# Patient Record
(demographics unavailable — no encounter records)

---

## 2020-02-05 NOTE — RAD
Right lower extremity venous Doppler ultrasound

 

History: Right leg pain and swelling.

 

Comparison: None.

 

Procedure: Color flow Doppler, Doppler spectral analysis, and 2D images 

are obtained with and without compression in the area of the common 

femoral vein, superficial femoral vein - femoral vein junction, main 

femoral vein (superficial femoral vein) and popliteal vein. Veins of the 

proximal calf are also imaged.

 

Findings: 

There is normal color flow, augmentation, and compressibility of all 

visualized vein segments. No evidence of deep venous thrombus is present. 

 

There is a complex collection lateral to the popliteal fossa measuring 5.1

x 3.7 x 1.6 cm. The lesion is mostly hypoechoic. Small internal 

echogenicities may be calcifications. Color Doppler interrogation is 

negative. Chronic hematoma is a consideration.

 

More inferiorly in the lateral calf there is relatively anechoic fluid 

measuring 7.7 x 3 x 2.1 cm. Finding could be a dissected popliteal fossa 

cyst or a seroma.

 

IMPRESSION:

Right lower extremity is negative for DVT.

 

Electronically signed by: Pedro Hooker MD (2/5/2020 12:17 PM) [id number]

## 2020-02-09 NOTE — PHYS DOC
Past History


Past Medical History:  Hypertension, Other


Additional Past Medical Histor:  Blood clots, Cardiac


Past Surgical History:  Pacemaker


Alcohol Use:  None





Adult General


Chief Complaint


Chief Complaint:  MECHANICAL FALL





HPI


HPI





Patient is a 80-year-old  female on [facility] presents to the ER 

secondary to a fall. Patient states that she was in the parking lot at [facility] 

and she lost her balance. She has pain in the left hand and the laceration on t

he posterior aspect. No obvious deformities or numbness or tingling. She also 

points of contusion/hematoma to the right hip but does not have pain with 

ambulation. Her tetanus is not up-to-date. She did not strike her head. Chest 

pain, shortness of breath, neck pain, back pain, other extremity pain.





Review of Systems


Review of Systems


All other ROS is negative unless otherwise stated in HPI





Allergies


Allergies





Allergies








Coded Allergies Type Severity Reaction Last Updated Verified


 


  clopidogrel Allergy Intermediate  2/9/20 Yes


 


  Sulfa (Sulfonamide Antibiotics) Allergy Unknown  2/9/20 Yes


 


  penicillin G Allergy Unknown  2/9/20 Yes











Physical Exam


Physical Exam


See above


Constitutional: Well developed, well nourished, no acute distress, non-toxic 

appearance. []


HENT: Normocephalic, atraumatic, bilateral external ears normal, oropharynx mois

t, no oral exudates, nose normal. []


Eyes: PERRLA, EOMI, conjunctiva normal, no discharge. [] 


Neck: Normal range of motion, no tenderness, supple, no stridor. [] 


Cardiovascular:Heart rate regular rhythm, no murmur []


Lungs & Thorax:  Bilateral breath sounds clear to auscultation []


Abdomen: Bowel sounds normal, soft, no tenderness, no masses, no pulsatile 

masses. [] 


Skin: There is a 4 similar laceration to the posterior aspect of the left hand 

along with contusion noted.


Back: No tenderness, no CVA tenderness. [] 


Extremities: Normal except the left hand as described under skin and the right 

hip which has a hematoma and tenderness on the lateral aspect. Hematoma is 

approximately the size of a baseball. There is also swelling to the right elbow 

that is consistent with olecranon bursitis versus swelling from a fracture.


Neurologic: Alert and oriented X 3, normal motor function, normal sensory 

function, no focal deficits noted. []


Psychologic: Affect normal, judgement normal, mood normal. []





Current Patient Data


Vital Signs





                                   Vital Signs








  Date Time  Temp Pulse Resp B/P (MAP) Pulse Ox O2 Delivery O2 Flow Rate FiO2


 


2/9/20 15:31 97.8 106 18 157/79 (105) 99 Room Air  











EKG


EKG


[]





Radiology/Procedures


Radiology/Procedures


[]4 cm irregular laceration to the left posterior hand was anesthetized with 1% 

lidocaine with epinephrine, 3 mL. Area was cleaned by nursing staff. Patient 

laceration was closed with 4. 0 Ethilon simple interrupted sutures, #6. The 

remainder of the laceration was closed using Steri-Strips and a dressing was 

applied by nursing staff. Patient tolerated well.














Preliminary interpretation by ED doc shows negative right hip, right elbow, and 

left hand xrays.





Course & Med Decision Making


Course & Med Decision Making


X-ray left hand, x-ray hip, tenderness, or laceration





Xrays negative per ED interpret. F/u sooner if pain worsens. Able to ambulate 

without pain and no pain with ROM right elbow..





Dragon Disclaimer


Dragon Disclaimer


This electronic medical record was generated, in whole or in part, using a voice

 recognition dictation system.





Departure


Departure:


Impression:  


   Primary Impression:  


   Fall


   Additional Impressions:  


   Laceration of left hand


   Contusion of left hand


   Hip hematoma, right


   Swelling of right elbow


Disposition:  01 HOME, SELF-CARE


Condition:  STABLE


Referrals:  


CODY SANCHEZ MD (PCP)


Follow up in 7-10 days for suture removal.


Patient Instructions:  Laceration Care, Adult





Additional Instructions:  


If you elbow start hurting worse please follow up sooner.





Problem Qualifiers











DAQUAN ROBERTS DO                Feb 9, 2020 15:50

## 2020-02-09 NOTE — RAD
Exam: Right elbow 2 views

 

INDICATION: Pain with fall

 

TECHNIQUE: Frontal and lateral views of the right elbow

 

Comparisons: None

 

FINDINGS:

Soft tissue swelling overlying the olecranon. No acute fractures 

identified. Bone mineralization is normal. Joint spaces are 

well-maintained.

 

IMPRESSION:

Soft tissue swelling overlying the olecranon, may relate to edema or 

bursitis. No acute osseous abnormality identified.

 

Electronically signed by: Paige Noland MD (2/9/2020 6:32 PM) [id number]

## 2020-02-09 NOTE — RAD
Exam: Left hand 3 views

 

INDICATION: Pain with fall

 

TECHNIQUE: Frontal, lateral and oblique views of the left and

 

Comparisons: None

 

FINDINGS:

Mild soft tissue swelling overlying the dorsum of the metacarpals. Bone 

mineralization is normal. No acute or healed fractures. 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

joints.

 

IMPRESSION:

Soft tissue swelling overlying the dorsum of the metacarpals without 

underlying osseous abnormality.

 

Electronically signed by: Paige Noland MD (2/9/2020 6:48 PM) [id number]

## 2020-02-09 NOTE — RAD
Exam: Right hip 2 views

 

INDICATION: Pain with fall

 

TECHNIQUE: Frontal and frog-leg lateral views of the right hip

 

Comparisons: None

 

FINDINGS:

Bone mineralization is normal. There is moderate osteophytic change at the

hip joint. Mild irregularity noted at the superior pubic ramus on frontal 

view. Soft tissues are unremarkable.

 

IMPRESSION:

Mild cortical irregularity noted along the superior aspect of the superior

pubic rami on the right seen only on frontal view. These findings are 

nonspecific however given history of trauma, CT would better evaluate for 

a nondisplaced fracture.

 

Electronically signed by: Paige Noland MD (2/9/2020 7:15 PM) [id number]